# Patient Record
Sex: FEMALE | Race: WHITE | NOT HISPANIC OR LATINO | Employment: UNEMPLOYED | URBAN - METROPOLITAN AREA
[De-identification: names, ages, dates, MRNs, and addresses within clinical notes are randomized per-mention and may not be internally consistent; named-entity substitution may affect disease eponyms.]

---

## 2017-01-23 ENCOUNTER — ALLSCRIPTS OFFICE VISIT (OUTPATIENT)
Dept: OTHER | Facility: OTHER | Age: 37
End: 2017-01-23

## 2017-01-23 DIAGNOSIS — R11.2 NAUSEA WITH VOMITING: ICD-10-CM

## 2017-01-23 DIAGNOSIS — J20.9 ACUTE BRONCHITIS: ICD-10-CM

## 2017-01-26 ENCOUNTER — HOSPITAL ENCOUNTER (OUTPATIENT)
Dept: RADIOLOGY | Facility: HOSPITAL | Age: 37
Discharge: HOME | End: 2017-01-26
Attending: FAMILY MEDICINE
Payer: COMMERCIAL

## 2017-01-26 ENCOUNTER — TRANSCRIBE ORDERS (OUTPATIENT)
Dept: ADMINISTRATIVE | Facility: HOSPITAL | Age: 37
End: 2017-01-26

## 2017-01-26 ENCOUNTER — GENERIC CONVERSION - ENCOUNTER (OUTPATIENT)
Dept: OTHER | Facility: OTHER | Age: 37
End: 2017-01-26

## 2017-01-26 DIAGNOSIS — R11.2 NAUSEA WITH VOMITING: ICD-10-CM

## 2017-01-26 DIAGNOSIS — J20.9 ACUTE BRONCHITIS: ICD-10-CM

## 2017-01-26 PROCEDURE — 71020 HB CHEST X-RAY 2VW FRONTAL&LATL: CPT

## 2017-02-20 ENCOUNTER — ALLSCRIPTS OFFICE VISIT (OUTPATIENT)
Dept: OTHER | Facility: OTHER | Age: 37
End: 2017-02-20

## 2017-02-21 ENCOUNTER — GENERIC CONVERSION - ENCOUNTER (OUTPATIENT)
Dept: OTHER | Facility: OTHER | Age: 37
End: 2017-02-21

## 2017-02-23 ENCOUNTER — GENERIC CONVERSION - ENCOUNTER (OUTPATIENT)
Dept: OTHER | Facility: OTHER | Age: 37
End: 2017-02-23

## 2017-02-23 LAB
ADEQUACY: (HISTORICAL): NORMAL
CLINICIAN PROVIDIED ICD 9 OR 10 (HISTORICAL): NORMAL
COMMENT (HISTORICAL): NORMAL
DIAGNOSIS (HISTORICAL): NORMAL
HPV HIGH RISK (HISTORICAL): NEGATIVE
NOTE: (HISTORICAL): NORMAL
PERFORMED BY (HISTORICAL): NORMAL

## 2018-01-12 VITALS
HEIGHT: 63 IN | HEART RATE: 96 BPM | TEMPERATURE: 98.4 F | BODY MASS INDEX: 37.39 KG/M2 | WEIGHT: 211 LBS | DIASTOLIC BLOOD PRESSURE: 70 MMHG | RESPIRATION RATE: 14 BRPM | SYSTOLIC BLOOD PRESSURE: 128 MMHG

## 2018-01-13 NOTE — RESULT NOTES
Verified Results  (1923 [facility]) Pap IG, HPV-hr [id number] 12:00AM [place] Up     Test Name Result Flag Reference   DIAGNOSIS: Comment     NEGATIVE FOR INTRAEPITHELIAL LESION AND MALIGNANCY  Specimen adequacy: Comment     Satisfactory for evaluation  Endocervical and/or squamous metaplastic  cells (endocervical component) are present  Clinician provided ICD10: Comment     Z12 4   Performed by: Coco Mercedes Cytotechnologist       Note: Comment     The Pap smear is a screening test designed to aid in the detection of  premalignant and malignant conditions of the uterine cervix  It is not a  diagnostic procedure and should not be used as the sole means of detecting  cervical cancer  Both false-positive and false-negative reports do occur  HPV, high-risk Negative  Negative   This high-risk HPV test detects thirteen high-risk types  (16/18/31/33/35/39/45/51/52/56/58/59/68) without differentiation  Discussion/Summary   Troy Zuleta- your pap was normal and negative for HPV  Repeat in 5 years is recommended when both pap and HPV is normal/negative  [address]     Signatures   Electronically signed by : Marcela Alejo; Feb 23 2017  8:52AM EST                       (Author)

## 2018-01-17 NOTE — PROGRESS NOTES
Assessment    1  Encounter for preventive health examination (V70 0) (Z00 00)   2  Encounter for Papanicolaou smear for cervical cancer screening (V76 2) (Z12 4)   3  Body mass index (BMI) of 37 0 to 37 9 in adult (V85 37) (Z68 37)   4  Screening for cardiovascular condition (V81 2) (Z13 6)   5  Screening for thyroid disorder (V77 0) (Z13 29)    Plan  Encounter for Papanicolaou smear for cervical cancer screening    · (1) THIN PREP PAP WITH IMAGING; Status: In Progress - Specimen/Data Collected;    Done: 82Wvq9528  Maturation index required? : No  HPV? : Regardless of Interpretation  Health Maintenance    · Always use a seat belt and shoulder strap when riding or driving a motor vehicle ;  Status:Complete;   Done: 18LBX5766   · Begin a limited exercise program ; Status:Complete;   Done: 67HBY3025   · Diets that are low in carbohydrates and high in protein are very popular for weight loss ;  Status:Complete;   Done: 39EIH7190   · Eat a low fat and low cholesterol diet ; Status:Complete;   Done: 79PUQ6693   · Use a sun block product with an SPF of 15 or more ; Status:Complete;   Done:  91SHJ1455   · We encourage all of our patients to exercise regularly  30 minutes of exercise or physical  activity five or more days a week is recommended for children and adults ;  Status:Complete;   Done: 95DBC0051   · We recommend that you bring your body mass index down to 26 ; Status:Complete;    Done: 81BNE9940   · We recommend that you examine your own breasts for lumps and other changes ;  Status:Complete;   Done: 06PNU2895   · Call [phone] if: You have any warning signs of skin cancer ; Status:Complete;    Done: 26CUP1236  Screening for cardiovascular condition, Screening for thyroid disorder    · (1) CBC/PLT/DIFF; Status:Active; Requested for:69Tlo9149;    · (1) COMPREHENSIVE METABOLIC PANEL; Status:Active; Requested for:22Uaa9334;    · (1) LIPID PANEL, FASTING; Status:Active;  Requested for:73Ntx4742;    · (1) TSH WITH FT4 REFLEX; Status:Active; Requested for:00Uqv0317;    · (1) CBC/PLT/DIFF; Status:Canceled;    · (1) COMPREHENSIVE METABOLIC PANEL; Status:Canceled;    · (1) LIPID PANEL, FASTING; Status:Canceled;    · (1) TSH WITH FT4 REFLEX; Status:Canceled; Discussion/Summary  health maintenance visit Currently, she eats a poor diet and has an adequate exercise regimen  Pap test was done today Breast cancer screening: monthly self breast exam was advised  Advice and education were given regarding nutrition and aerobic exercise  Chief Complaint  pt present for CPE  ac/cma      History of Present Illness  HM, Adult Female: The patient is being seen for a health maintenance and gynecology evaluation  General Health: The patient's health since the last visit is described as good  She has regular dental visits  She complains of vision problems  Immunizations status: up to date  Lifestyle:  She does not have a healthy diet  Diet problems: needs elimination of junk food  She has weight concerns  Weight control issues: overweight  She exercises regularly  She exercises 3 or more times per week  Exercise includes aerobic conditioning and strength training  She does not use tobacco  She denies alcohol use  She denies drug use  Reproductive health: the patient is premenopausal   she reports normal menses  Screening: Cervical cancer screening includes uncertain timing of her last pap smear  HPI: Here for CPE  Due for labs  Offers no complaints or concerns  Review of Systems    Constitutional: no fever, not feeling poorly, no chills and not feeling tired  Eyes: no eyesight problems  Cardiovascular: no chest pain, no palpitations and no lower extremity edema  Respiratory: no shortness of breath and no cough  Gastrointestinal: no abdominal pain, no nausea, no vomiting and no diarrhea  Musculoskeletal: no arthralgias and no myalgias  Integumentary: no rashes and no skin lesions     Neurological: no headache, no numbness and no tingling  Psychiatric: no anxiety and no depression  Active Problems    1  Aphthous ulcer (528 2) (K12 0)   2  Body mass index (BMI) of 37 0 to 37 9 in adult (V85 37) (Z68 37)   3  Nausea and vomiting (787 01) (R11 2)   4  Need for immunization against influenza (V04 81) (Z23)   5  Submandibular lymphadenopathy (785 6) (R59 0)    Past Medical History    · History of Acute bronchitis, unspecified organism (466 0) (J20 9)    Surgical History    · Denied: History Of Prior Surgery    Family History  Mother    · Family history of glaucoma (V19 11) (Z80 56)  Father    · Family history of lymphoma (V16 7) (Z80 2)  Maternal Aunt    · Family history of breast cancer (V16 3) (Z80 3)    Social History    · Never a smoker    Current Meds   1  Daily Vitamin Oral Tablet; Take 1 tablet daily Recorded    Allergies    1  No Known Drug Allergies    Vitals   Recorded: 92Zuf2673 01:35PM   Temperature 97 9 F   Heart Rate 72   Respiration 16   Systolic 134   Diastolic 70   Height 5 ft 3 in   Weight 211 lb    BMI Calculated 37 38   BSA Calculated 1 98     Physical Exam    Constitutional   General appearance: No acute distress, well appearing and well nourished  Head and Face   Head and face: Normal     Eyes   Conjunctiva and lids: No swelling, erythema or discharge  Pupils and irises: Equal, round, reactive to light  Ears, Nose, Mouth, and Throat   Otoscopic examination: Tympanic membranes translucent with normal light reflex  Canals patent without erythema  Nasal mucosa, septum, and turbinates: Normal without edema or erythema  Oropharynx: Normal with no erythema, edema, exudate or lesions  Neck   Neck: Supple, symmetric, trachea midline, no masses  Thyroid: Normal, no thyromegaly  Pulmonary   Respiratory effort: No increased work of breathing or signs of respiratory distress  Auscultation of lungs: Clear to auscultation      Cardiovascular   Auscultation of heart: Normal rate and rhythm, normal S1 and S2, no murmurs  Carotid pulses: 2+ bilaterally  Pedal pulses: 2+ bilaterally  Examination of extremities for edema and/or varicosities: Normal     Chest   Breasts: Normal, no dimpling or skin changes appreciated  Palpation of breasts and axillae: Normal, no masses palpated  Abdomen   Abdomen: Non-tender, no masses  Liver and spleen: No hepatomegaly or splenomegaly  Genitourinary   External genitalia and vagina: Normal, no lesions appreciated  Cervix: Normal, no lesions, Pap obtained  A Pap smear was performed  Uterus: Normal size, no tenderness, no masses  Adnexa/Parametria: Normal, no masses or tenderness  Lymphatic   Palpation of lymph nodes in neck: No lymphadenopathy  Palpation of lymph nodes in axillae: No lymphadenopathy  Musculoskeletal   Gait and station: Normal     Range of motion: Normal     Muscle strength/tone: Normal     Skin   Skin and subcutaneous tissue: Normal without rashes or lesions  Neurologic   Reflexes: 2+ and symmetric  Sensation: No sensory loss  Psychiatric   Mood and affect: Normal        Procedure    Procedure: Visual Acuity Test    Indication: routine screening  Inforrmation supplied by a Snellen chart  Results: 20/13 in both eyes without corrective device, 20/13 in the right eye without corrective device, 20/13 in the left eye without corrective device      Attending Note  Collaborating Physician Note: Collaborating Physician: I agree with the Advanced Practitioner note        Signatures   Electronically signed by : Brigette Santoyo; Feb 20 2017  7:19PM EST                       (Author)    Electronically signed by : Mena Kaiser DO; Feb 20 2017  7:30PM EST                       (Review)

## 2018-01-22 VITALS
RESPIRATION RATE: 16 BRPM | HEIGHT: 63 IN | BODY MASS INDEX: 37.39 KG/M2 | WEIGHT: 211 LBS | SYSTOLIC BLOOD PRESSURE: 122 MMHG | HEART RATE: 72 BPM | DIASTOLIC BLOOD PRESSURE: 70 MMHG | TEMPERATURE: 97.9 F

## 2025-06-11 NOTE — RESULT NOTES
Please assist what time on June 30th , needs a 30 min slot- thank you   Verified Results  * XR CHEST PA & LATERAL [id number] 08:54AM Tray Gambino Order Number: [id number]     Test Name Result Flag Reference   XR CHEST PA & LATERAL (Report)     CHEST      INDICATION: Chest pain and cough     COMPARISON: None     VIEWS: Frontal and lateral projections; 2 images     FINDINGS:        Cardiomediastinal silhouette appears unremarkable  The lungs are clear  No pneumothorax or pleural effusion  Visualized osseous structures appear within normal limits for the patient's age  IMPRESSION:     No active pulmonary disease         Workstation performed: [id number]     Signed by:   Perlita Aldrich MD   1/26/17       Discussion/Summary   Your chest x-ray is normal    Dr Scott Haider